# Patient Record
Sex: FEMALE | Race: WHITE | NOT HISPANIC OR LATINO | Employment: UNEMPLOYED
[De-identification: names, ages, dates, MRNs, and addresses within clinical notes are randomized per-mention and may not be internally consistent; named-entity substitution may affect disease eponyms.]

---

## 2017-07-08 ENCOUNTER — HEALTH MAINTENANCE LETTER (OUTPATIENT)
Age: 27
End: 2017-07-08

## 2020-02-27 ENCOUNTER — RECORDS - HEALTHEAST (OUTPATIENT)
Dept: ADMINISTRATIVE | Facility: OTHER | Age: 30
End: 2020-02-27

## 2020-03-13 ENCOUNTER — AMBULATORY - HEALTHEAST (OUTPATIENT)
Dept: MATERNAL FETAL MEDICINE | Facility: HOSPITAL | Age: 30
End: 2020-03-13

## 2020-03-13 DIAGNOSIS — O26.90 PREGNANCY, ANTEPARTUM, COMPLICATIONS: ICD-10-CM

## 2020-03-13 DIAGNOSIS — O36.0190 ANTI-D ANTIBODIES PRESENT DURING PREGNANCY: ICD-10-CM

## 2020-03-17 ENCOUNTER — RECORDS - HEALTHEAST (OUTPATIENT)
Dept: ADMINISTRATIVE | Facility: OTHER | Age: 30
End: 2020-03-17

## 2020-03-18 ENCOUNTER — AMBULATORY - HEALTHEAST (OUTPATIENT)
Dept: MATERNAL FETAL MEDICINE | Facility: HOSPITAL | Age: 30
End: 2020-03-18

## 2020-03-20 ENCOUNTER — OFFICE VISIT - HEALTHEAST (OUTPATIENT)
Dept: MATERNAL FETAL MEDICINE | Facility: HOSPITAL | Age: 30
End: 2020-03-20

## 2020-03-20 ENCOUNTER — RECORDS - HEALTHEAST (OUTPATIENT)
Dept: ULTRASOUND IMAGING | Facility: HOSPITAL | Age: 30
End: 2020-03-20

## 2020-03-20 ENCOUNTER — RECORDS - HEALTHEAST (OUTPATIENT)
Dept: ADMINISTRATIVE | Facility: OTHER | Age: 30
End: 2020-03-20

## 2020-03-20 DIAGNOSIS — Z34.02 ENCOUNTER FOR SUPERVISION OF NORMAL FIRST PREGNANCY IN SECOND TRIMESTER: ICD-10-CM

## 2020-03-20 DIAGNOSIS — O36.0190 ANTI-D ANTIBODIES PRESENT DURING PREGNANCY: ICD-10-CM

## 2020-03-20 DIAGNOSIS — O36.1920 MATERNAL RED CELL ALLOIMMUNIZATION IN SECOND TRIMESTER, ANTEPARTUM, NOT APPLICABLE OR UNSPECIFIED FETUS: ICD-10-CM

## 2020-03-20 DIAGNOSIS — O36.0190: ICD-10-CM

## 2020-05-01 ENCOUNTER — OFFICE VISIT - HEALTHEAST (OUTPATIENT)
Dept: MATERNAL FETAL MEDICINE | Facility: HOSPITAL | Age: 30
End: 2020-05-01

## 2020-07-09 ENCOUNTER — AMBULATORY - HEALTHEAST (OUTPATIENT)
Dept: OBGYN | Facility: CLINIC | Age: 30
End: 2020-07-09

## 2020-07-09 DIAGNOSIS — O13.3 GESTATIONAL HYPERTENSION, THIRD TRIMESTER: ICD-10-CM

## 2020-07-14 ENCOUNTER — AMBULATORY - HEALTHEAST (OUTPATIENT)
Dept: FAMILY MEDICINE | Facility: CLINIC | Age: 30
End: 2020-07-14

## 2020-07-14 DIAGNOSIS — O13.3 GESTATIONAL HYPERTENSION, THIRD TRIMESTER: ICD-10-CM

## 2020-07-16 ENCOUNTER — COMMUNICATION - HEALTHEAST (OUTPATIENT)
Dept: FAMILY MEDICINE | Facility: CLINIC | Age: 30
End: 2020-07-16

## 2020-07-18 ENCOUNTER — ANESTHESIA - HEALTHEAST (OUTPATIENT)
Dept: OBGYN | Facility: HOSPITAL | Age: 30
End: 2020-07-18

## 2020-08-02 ENCOUNTER — ANESTHESIA - HEALTHEAST (OUTPATIENT)
Dept: SURGERY | Facility: HOSPITAL | Age: 30
End: 2020-08-02

## 2020-08-02 ENCOUNTER — SURGERY - HEALTHEAST (OUTPATIENT)
Dept: SURGERY | Facility: HOSPITAL | Age: 30
End: 2020-08-02

## 2020-08-02 ASSESSMENT — MIFFLIN-ST. JEOR
SCORE: 1791.36
SCORE: 1814.04

## 2020-08-03 ENCOUNTER — COMMUNICATION - HEALTHEAST (OUTPATIENT)
Dept: SCHEDULING | Facility: CLINIC | Age: 30
End: 2020-08-03

## 2020-08-04 ENCOUNTER — COMMUNICATION - HEALTHEAST (OUTPATIENT)
Dept: CARE COORDINATION | Facility: CLINIC | Age: 30
End: 2020-08-04

## 2020-08-16 ENCOUNTER — AMBULATORY - HEALTHEAST (OUTPATIENT)
Dept: PEDIATRICS | Facility: CLINIC | Age: 30
End: 2020-08-16

## 2021-06-04 VITALS — BODY MASS INDEX: 30.8 KG/M2 | HEIGHT: 71 IN | WEIGHT: 220 LBS
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
## 2021-06-09 NOTE — ANESTHESIA PREPROCEDURE EVALUATION
Anesthesia Evaluation      Patient summary reviewed     Airway    Pulmonary - negative ROS and normal exam                          Cardiovascular - normal exam  (+) hypertension (Gestational), ,      Neuro/Psych - negative ROS     Endo/Other    (+) obesity, pregnant     GI/Hepatic/Renal - negative ROS           Dental                         Anesthesia Plan  Planned anesthetic: epidural    ASA 3     Anesthetic plan and risks discussed with: patient and spouse    Post-op plan: routine recovery

## 2021-06-09 NOTE — ANESTHESIA POSTPROCEDURE EVALUATION
"Patient: Ronda Cortes  * No procedures listed *  Anesthesia type: epidural    Patient location: Labor and Delivery  /72   Pulse 96   Temp 37.1  C (98.8  F)   Resp 18   Ht 5' 11\" (1.803 m)   Wt (!) 245 lb 1.6 oz (111.2 kg)   LMP 10/31/2019   SpO2 98%   Breastfeeding Unknown   BMI 34.18 kg/m    CNS normal. No post dural puncture headache. No noted or reported complications of labor epidural.  "

## 2021-06-09 NOTE — ANESTHESIA PROCEDURE NOTES
Epidural Block    Patient location during procedure: OB  Time Called: 7/18/2020 1:16 PM  Reason for Block:labor epidural  Staffing:  Performing  Anesthesiologist: Jane Veras MD  Preanesthetic Checklist  Completed: patient identified, risks, benefits, and alternatives discussed, timeout performed, consent obtained, airway assessed, oxygen available, suction available, emergency drugs available and hand hygiene performed  Procedure  Patient position: sitting  Prep: ChloraPrep  Patient monitoring: continuous pulse oximetry, heart rate and blood pressure  Approach: midline  Location: L2-L3  Injection technique: BLUE saline  Number of Attempts:1  Needle  Needle type: Cheikh   Needle gauge: 18 G     Catheter in Space: 5  Assessment  Sensory level:  No complications

## 2021-06-10 NOTE — ANESTHESIA CARE TRANSFER NOTE
Last vitals:   Vitals:    08/02/20 0529   BP: 107/66   Pulse: 95   Resp: 21   Temp:    SpO2: 100%     Patient's level of consciousness is drowsy  Spontaneous respirations: yes  Maintains airway independently: yes  Dentition unchanged: yes  Oropharynx: oropharynx clear of all foreign objects    QCDR Measures:  ASA# 20 - Surgical Safety Checklist: WHO surgical safety checklist completed prior to induction    PQRS# 430 - Adult PONV Prevention: 4558F - Pt received => 2 anti-emetic agents (different classes) preop & intraop  ASA# 8 - Peds PONV Prevention: NA - Not pediatric patient, not GA or 2 or more risk factors NOT present  PQRS# 424 - Irina-op Temp Management: 4559F - At least one body temp DOCUMENTED => 35.5C or 95.9F within required timeframe  PQRS# 426 - PACU Transfer Protocol: - Transfer of care checklist used  ASA# 14 - Acute Post-op Pain: ASA14B - Patient did NOT experience pain >= 7 out of 10

## 2021-06-10 NOTE — PROGRESS NOTES
Chart Reviewed by Clinical Product Navigator; patient identified on recently hospital discharge report.    Review Results: patient presented for postpartum bleeding, underwent procedure and has been discharged to home. No Care Coordination referral or outreach initiated at this time. Patient to follow up with her OB/GYN.    Rossi Fall RN  Clinical Product Navigator

## 2021-06-10 NOTE — ANESTHESIA PREPROCEDURE EVALUATION
Anesthesia Evaluation      Patient summary reviewed   No history of anesthetic complications     Airway   Mallampati: II  Neck ROM: full   Pulmonary - negative ROS and normal exam                          Cardiovascular - negative ROS  Exercise tolerance: > or = 4 METS  Hypertension: Hx of PIH.  Rhythm: regular        Neuro/Psych - negative ROS     Endo/Other - negative ROS   Pregnant now: 2 weeks PP.     GI/Hepatic/Renal - negative ROS      Other findings:     NPO > 8 hrs    Retained placenta     COVID unknown      Results for ORALIA COOK (MRN [id number]) as of 8/2/2020 8/2/2020 03:10  Sodium: 134 (L)  Potassium: 3.8  Chloride: 105  CO2: 19 (L)  Anion Gap, Calculation: 10  BUN: 11  Creatinine: 0.80  GFR MDRD Af Amer: >60  GFR MDRD Non Af Amer: >60  Calcium: 8.7  AST: 9  ALT: 10  ALBUMIN: 2.9 (L)  Protein, Total: 5.6 (L)  Alkaline Phosphatase: 101  Bilirubin, Total: 0.4  Bilirubin, Direct: 0.1  Glucose: 143 (H)  INR: 0.94  PTT: 25  WBC: 18.5 (H)  RBC: 3.28 (L)  Hemoglobin: 9.2 (L)  Hematocrit: 28.4 (L)  MCV: 87  MCH: 28.0  MCHC: 32.4  RDW: 12.8  Platelets: 337        Dental - normal exam                        Anesthesia Plan  Planned anesthetic: general endotracheal      2 IVs  Ketamine 100 mg for induction  Sux  Robinul  Albumin  2 PRBC  Enmanuel gtt    ASA 2 - emergent   Induction: intravenous   Anesthetic plan and risks discussed with: patient  Anesthesia plan special considerations: rapid sequence induction, antiemetics, IV therapy two IVs,   Post-op plan: routine recovery

## 2021-06-10 NOTE — ANESTHESIA POSTPROCEDURE EVALUATION
Patient: Ronda Cortes  Procedure(s):  DILATION AND CURETTAGE, UTERUS, USING SUCTION  Anesthesia type: general    Patient location: PACU  Last vitals:   Vitals Value Taken Time   /64 8/2/2020  5:30 AM   Temp 37.7  C (99.9  F) 8/2/2020  5:26 AM   Pulse 91 8/2/2020  5:35 AM   Resp 21 8/2/2020  5:35 AM   SpO2 100 % 8/2/2020  5:35 AM   Vitals shown include unvalidated device data.  Post vital signs: stable  Level of consciousness: awake and responds to simple questions  Post-anesthesia pain: pain controlled  Post-anesthesia nausea and vomiting: no  Pulmonary: unassisted, return to baseline  Cardiovascular: stable and blood pressure at baseline  Hydration: adequate  Anesthetic events: no    QCDR Measures:  ASA# 11 - Irina-op Cardiac Arrest: ASA11B - Patient did NOT experience unanticipated cardiac arrest  ASA# 12 - Irina-op Mortality Rate: ASA12B - Patient did NOT die  ASA# 13 - PACU Re-Intubation Rate: ASA13B - Patient did NOT require a new airway mgmt  ASA# 10 - Composite Anes Safety: ASA10A - No serious adverse event    Additional Notes:

## 2021-06-16 PROBLEM — Z34.90 PREGNANT: Status: ACTIVE | Noted: 2020-07-16

## 2021-06-20 NOTE — LETTER
Letter by Daphnie Oliver RN at      Author: Daphnie Oliver RN Service: -- Author Type: --    Filed:  Encounter Date: 7/16/2020 Status: (Other)       7/16/2020        Ronda Cortes  [address] [place] [address]    This letter provides a written record that you were tested for COVID-19 on 7/14/2020.     Your result was negative. This means that we didnt find the virus that causes COVID-19 in your sample. A test may show negative when you do actually have the virus. This can happen when the virus is in the early stages of infection, before you feel illness symptoms.    If you have symptoms   Stay home and away from others (self-isolate) until you meet ALL of the guidelines below:    Youve had no fever--and no medicine that reduces fever--for 3 full days (72 hours). And ?    Your other symptoms have gotten better. For example, your cough or breathing has improved. And?    At least 10 days have passed since your symptoms started.    During this time:    Stay home. Dont go to work, school or anywhere else.     Stay in your own room, including for meals. Use your own bathroom if you can.    Stay away from others in your home. No hugging, kissing or shaking hands. No visitors.    Clean high touch surfaces often (doorknobs, counters, handles, etc.). Use a household cleaning spray or wipes. You can find a full list on the EPA website at www.epa.gov/pesticide-registration/list-n-disinfectants-use-against-sars-cov-2.    Cover your mouth and nose with a mask, tissue or washcloth to avoid spreading germs.    Wash your hands and face often with soap and water.    Going back to work  Check with your employer for any guidelines to follow for going back to work.    Employers: This document serves as formal notice that your employee tested negative for COVID-19, as of the testing date shown above.

## 2021-06-29 NOTE — PROGRESS NOTES
Progress Notes by Teresa Meredith CNP at 2020  2:40 PM     Author: Teresa Meredith CNP Service: -- Author Type: Nurse Practitioner    Filed: 2020  2:40 PM Encounter Date: 2020 Status: Signed    : Teresa Meredith CNP (Nurse Practitioner)       [facility] Lactation Visit    Assessment:  Liliana Cortes is a 4 wk.o. old female infant born at Gestational Age: 37w2d via Vaginal, Spontaneous delivery on 2020 at 11:49 PM here for lactation support.    Liliana Cortes is doing well. Liliana Cortes has gained 4.9 oz since their last visit 5 days ago; approximately 0.9 oz per day.  She is up 21% from birthweight. Mother is mainly pumping and giving breast-milk via bottle. Mom nurses from time to time. Mom's main concern today is to how to ensure she has enough milk supply.     Did attempt to nurse infant in clinic today. Infant was very sleepy at the breast and only very few swallows were heard. She was able to transfer 0.2 oz from the breast today. She does present with ankyloglossia. Aura score today is 13, which does indicate intervention to improve breast-feedings. Discussed this with mother who states that she will discuss with father about repairing ankyloglossia. At this time, mother doesn't feel that it is warranted though as she is mainly pumping.     Infant also presented a heart murmur in clinic today. She is pink with good perfusion. Mother reports no family history of CCHD. Infant passed CCHD screening. Placed referral to Cardiology for consult.     1.  difficulty in feeding at breast     2. Heart murmur  Ambulatory referral to Pediatric Cardiology   3. Congenital ankyloglossia         Plan:    Feeding plan: if wanting to nurse more often, try breast-feeding every 2-3 hours or more frequently based on infant cues; at least 8-12 times a day. When latching Liliana Cortes, make sure head, neck, and body are aligned an facing you.  "Support breast with \"sandwich\" hold or \"C\" hold while infant is breast-feeding. To obtain a deeper latch, aim the tip of the nipple to infant's roof of the mouth (aim for the nose). Ensure lips are flanged out. If having difficulty, wait for wide gape and gently apply downward pressure to the infant's chin. If latch is painful or lips are pursed in, unlatch Liliana Cortes and reposition. Make sure to stimulate Liliana Cortes to actively nurse. Listen for swallows. If swallows are less frequent, stimulate infant by tickling her hands or feet. You may also wipe a cool wash cloth on her face or hand express your breast for milk. Also skin-to-skin and undressing Liliana Cortes down to her diaper can be helpful. Burp Liliana Cortes before switching sides and burp again after breast-feeding. Keep Liliana Cortes in upright position for about 10-15 minutes after feeding, before laying her flat on her back.    A typical feeding is 10-15 minutes on each side; total of 20-30 minutes per breast-feeding session.    The latch should be like this:      Supplementation: after nursing, offer 3-5 oz of pumped milk or formula. She should feed at least every 3 hours. This amount may change based on her cues.     Age Average milk volume per feeding (mL) Average milk volume per feeding (oz) Average 24 hour milk intake (mL) Average 24 hour milk intake (oz)   Day 1 Few drops - 5mL < tsp Up to 30 mL Up to 1 oz   Day 2 5 - 15 mL <0.5 oz - 1 TB 30 - 120 mL 1 - 4 oz   Day 3 15 - 30 mL  0.5 - 1 oz 120 - 240 mL 4 - 8 oz   Day 4 30 - 45 mL  1 - 1.5 oz 240 - 360 mL 8 - 12 oz   Day 5-7 45 - 60 mL 1.5 - 2 oz 360 - 600 mL 12 - 18 oz   Week 2-3 60 - 90 mL 2 - 3 oz 450 - 750 mL 15 - 25 oz   Months 1-6 90 - 150 mL 3 - 5 oz 750 - 1035 mL 25 - 35 oz      Pumping plan:  Continue to pump every 2-3 hours. If looking to increase milk supply, add 2-3 extra pumping sessions per day. Try Moringa or go-lacta to help increase milk supply. The best way to " increase milk supply is by stimulation.     Continue to monitor output, expect at least 6 wet diapers per day and 2-4 stools in a day.     Follow up with your primary care provider next week with Pediatrician.  Follow up with Cardiology.  Follow up with lactation as needed.    Maternal nipple care:   Best way to help decrease nipple soreness is to obtain a deep latch. When you pump, nipples should not touch the sides of the flange. Apply lanolin or coconut oil after breast-feeding or pumping. Wipe away left over residue before next breast-feeding or pumping. Allow nipples to air dry as much as possible to help stimulate healing. If mother is experiencing persistent breast pain, flu-like symptoms, localized breast tenderness/redness/warmness, or fevers, please contact mother's primary care provider or Obstetrician/midwife for further evaluation.    Return to clinic sooner or call clinic sooner for any worsening symptoms (inconsolability, fever greater than 100.4F, less wet diapers, no stools, sleepiness, difficulty feeding, abdominal distention).    For further lactation concerns, please call [phone].   ____________________________________________________________________  SUBJECTIVE:     Liliana Cortes is a 4 wk.o. old female infant born at Gestational Age: 37w2d via Vaginal, Spontaneous delivery on 7/18/2020 at 11:49 PM here for lactation support. This patient is accompanied in the office by her mother.     Concerns: mom wants to make sure that mom has enough milk supply. Mom had retained placenta. Mom is mainly exclusively pump. Mom is pumping every 2-3 hours. Pump about 3-3.5 oz. Mom nurses after pumping. She feeds every 2-3 hours with pumped milk via bottle. She takes about 2.5-3.5 oz.      Baby has about 8 wet diapers and 1 stools per day. Stools are yellow in color.    Mom noticed her breasts grew larger and areolas darkened during pregnancy and she noticed primary engorgement when her milk came in on  "day 5.    Breastfeeding Goals: mom would like to ensure she is getting enough. Does not have to exclusively nurse.    Previous Breastfeeding Experience: none. First baby.    Breast-surgery: none.    Maternal medications: prenatal, milk supplement, iron supplement  Infant medications: vitamin D    Hospital course: difficulty with latching. Mild sacral dimple. Minimal jaundice.    [place] metabolic screening: normal    Patient Active Problem List    Diagnosis Date Noted   ? Hyperbilirubinemia,  2020   ? Sacral dimple in  2020   ? Term , current hospitalization 2020     Results for orders placed or performed during the hospital encounter of 20   [place] Metabolic Screen   Result Value Ref Range    Scan Result See Scanned Report    Cord Blood Evaluation on all infants of Rh negative mothers   Result Value Ref Range    ABO Rh O NEG     Cord Blood DELMY NEG Negative    ABO/Rh Repeat O NEG        Current Outpatient Medications:   ?  vitamin D3-tocophersolan 10 mcg-4 mg/ 0.2 mL Drop, Take by mouth., Disp: , Rfl:   No past medical history on file.  No past surgical history on file.  Family History   Problem Relation Age of Onset   ? Hypertension Mother         Copied from mother's history at birth       Primary care provider: Florence Alvarado MD    OBJECTIVE:    Mother:   Nipples are everted, the areola is compressible, the breast is soft and full.     Sore nipples: none.   Maternal pain: none.    Maternal depression screening: Doing well    Infant:   Vitals:    20 1406   Pulse: 140   Temp: 98.6  F (37  C)   TempSrc: Rectal   Weight: 9 lb 3.9 oz (4.193 kg)   Height: 21.5\" (54.6 cm)   HC: 36.2 cm (14.25\")       Average weight gain over last 5 days: 4.9 oz; approximately 0.9 oz per day     Weight:   Birthweight: 7 lb 10.1 oz (3.46 kg)  Clinic weight on : 8 lbs 15 oz  Today's weight:   Vitals:    20 1406   Weight: 9 lb 3.9 oz (4.193 kg)       21% from birth " "weight.    Weight after left side feedin lbs 4 oz  Weight after right side feedin lb 4.1 oz  Amount of milk transferred from LEFT side: 0.1 oz  Amount of milk transferred from RIGHT side: 0.1 oz    Total transfer: 0.2 oz    Feeding assessment:     Infant can draw gloved finger into mouth. Infant demonstrated a coordinated suck. Infant palate is intact, tongue over gums, anterior lingual frenulum  Infant can hold suction with tongue while at the breast. Infant did need frequent stimulation at the breast. Attempted with a 20 mm nipple shield, but infant was still very sleepy at the breast.    Alignment: Infant's head was aligned with its trunk. Infant did face mother. Baby was in cross-cradle position today. Discussed importance of infant ventral positioning to obtain a deeper latch.     Areolar Grasp:  Infant was assisted by gently applying downward pressure to the chin to open mouth wide. Infant's lips were not pursed. Infant's lips did flange outward. Tongue was visible just barely over bottom lip. Infant had complete seal.     Areolar Compression: Infant made rhythmic motion. There were no clicking or smacking sounds. There was no severe nipple discomfort.  Nipples appeared round after feeding.    Audible swallowing: Infant made very few quiet sounds of swallowing. There was an increase in frequency after milk ejection reflex.    PHYSICAL EXAM    Gen: Alert, no acute distress.   Head: Anterior and posterior fontanelles are flat and soft.   Eyes: No eye drainage. Sclera clear. Bilateral red reflexes present.   Ears: Pinna appear well-formed. No pits.   Nose: nares patent. No nasal congestion. No nasal flaring.  Mouth: Oral mucosa moist. Tongue midline (tongue elevation adequate when crying, good lateralization). Frenulum palpable and visible. Mild \"heart-shaped\" tongue. Tongue able to extend pass lower gumline. Lips closed at rest.   Neck: Clavicles intact bilaterally.  Lungs: Clear to auscultation " bilaterally.   Cardiac: Regular regular rate and rhythm, S1S2. Grade 2/6 heart murmur. Brachial and femoral pulses +2 and equal.  Abdomen: Soft, nontender, bowel sounds present, no hepatosplenomegaly or mass palpable. Umbilicus dry with no erythema or drainage.   : Indra stage 1 female genitalia  Skin: Intact. Dry. No rash. No jaundice.   Musculoskeletal: equal movements of upper and lower extremities. Negative Ortolani and Ayala maneuver.  Neuro: Appropriate muscle tone.    The visit lasted a total of 55 minutes that I spent face to face with the patient. Of that time, 45 minutes were spent on lactation. Over 50% of the time was spent counseling and educating the patient about normal  care and growth, breast-feeding, milk supply, ankyloglossia, and heart murmur.    Completed by:   MARIO Pina, CPNP, IBCLC  [facility]  [facility]  2020, 2:30 PM

## 2024-09-18 ENCOUNTER — OFFICE VISIT (OUTPATIENT)
Dept: NEUROLOGY | Facility: CLINIC | Age: 34
End: 2024-09-18
Payer: COMMERCIAL

## 2024-09-18 ENCOUNTER — ANCILLARY PROCEDURE (OUTPATIENT)
Dept: NEUROLOGY | Facility: CLINIC | Age: 34
End: 2024-09-18
Payer: COMMERCIAL

## 2024-09-18 VITALS
OXYGEN SATURATION: 99 % | SYSTOLIC BLOOD PRESSURE: 109 MMHG | WEIGHT: 252.2 LBS | DIASTOLIC BLOOD PRESSURE: 75 MMHG | HEIGHT: 71 IN | BODY MASS INDEX: 35.31 KG/M2 | HEART RATE: 63 BPM | TEMPERATURE: 97.5 F

## 2024-09-18 DIAGNOSIS — G40.909 SEIZURE DISORDER (H): Primary | ICD-10-CM

## 2024-09-18 DIAGNOSIS — G40.909 SEIZURE DISORDER (H): ICD-10-CM

## 2024-09-18 RX ORDER — LURASIDONE HYDROCHLORIDE 40 MG/1
40 TABLET, FILM COATED ORAL DAILY
COMMUNITY
Start: 2024-08-23

## 2024-09-18 RX ORDER — FOLIC ACID 1 MG/1
1 TABLET ORAL DAILY
Qty: 90 TABLET | Refills: 3 | Status: SHIPPED | OUTPATIENT
Start: 2024-09-18

## 2024-09-18 RX ORDER — HYDROXYZINE HYDROCHLORIDE 25 MG/1
25 TABLET, FILM COATED ORAL 3 TIMES DAILY PRN
COMMUNITY
Start: 2024-08-23

## 2024-09-18 RX ORDER — LEVETIRACETAM 750 MG/1
TABLET ORAL
Qty: 180 TABLET | Refills: 3 | Status: SHIPPED | OUTPATIENT
Start: 2024-09-18
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
## 2024-09-18 NOTE — PATIENT INSTRUCTIONS
We will have you continue the current dosing of Keppra, this has been refilled.    We have started folic acid 1mg daily, as a woman of childbearing age on an antiseizure medication    We will check a Keppra level today (blood draw)    We will have you undergo an MRI of the brain to evaluate for structural causes for seizures    Nurse education for a new diagnosis of seizures has been ordered    Follow up in 3 to 4 months with myself. Call sooner with questions, concerns, or worsening symptoms including breakthrough events.    Lifestyle recommendations:  Try to maintain a regular daily routine, with 8 hours of sleep, regular healthy meals, and routine activity/exercise.   Learn stress reduction techniques, such as controlled breathing exercises and meditation/mindfullness.   Avoid getting over tired, alcohol, and unprescribed drugs.  Avoid herbal remedies, as these may contain substances that alter how your body handles medications, or have undesirable effects on you.  Discuss any over the counter medications with your pharmacist or other health care provider.     Seizure safety precautions  [place] regulations on driving were reviewed with you and you should not drive until you are three months seizure/spell free.You are prohibited from operating a motor vehicle within 3 months following any seizure or other episode with sudden unconsciousness or inability to sit up, and that it is required to report most recent seizure to the DMV within 30 days after the event.    Avoid any activities that might lead to self-injury or injury of others, within 3 months following any seizure with impaired awareness or impaired motor control such activities include but are not limited to operating power tools, operating firearms, climbing ladders/trees/exposure to heights from which he might fall. Do not operate power tools or heavy machinery and equipment.  Do not swim alone, bathe in any form of tub, such as bathtub, jacuzzi, or  hot tub unless there is a responsible adult close by to provide assistance in case she has a seizure and drowns. Avoid work on hot surfaces such stoves, ovens, or with scalding hot water.
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
## 2024-09-19 LAB — LEVETIRACETAM SERPL-MCNC: 16.4 ΜG/ML (ref 10–40)

## 2025-05-12 ENCOUNTER — TELEPHONE (OUTPATIENT)
Dept: NEUROLOGY | Facility: CLINIC | Age: 35
End: 2025-05-12

## 2025-05-12 NOTE — TELEPHONE ENCOUNTER
Which pharmacy does pt go to?  [id number] - [place], [place] - [address]     Which medication is pt calling about?  Keppra    How much medication does pt have left?  >5 days left    Does pt have refills?  NO    Does pt need the refill within 24 hours?  NO    Is medication a controlled substance?  NO    Is pt scheduled for provider follow-up visit?  NO, Why? Pt switching providers closer to home